# Patient Record
Sex: MALE | Race: ASIAN | NOT HISPANIC OR LATINO
[De-identification: names, ages, dates, MRNs, and addresses within clinical notes are randomized per-mention and may not be internally consistent; named-entity substitution may affect disease eponyms.]

---

## 2023-01-30 PROBLEM — Z00.00 ENCOUNTER FOR PREVENTIVE HEALTH EXAMINATION: Status: ACTIVE | Noted: 2023-01-30

## 2023-02-03 ENCOUNTER — NON-APPOINTMENT (OUTPATIENT)
Age: 38
End: 2023-02-03

## 2023-02-03 ENCOUNTER — APPOINTMENT (OUTPATIENT)
Dept: OTOLARYNGOLOGY | Facility: CLINIC | Age: 38
End: 2023-02-03
Payer: MEDICAID

## 2023-02-03 VITALS — TEMPERATURE: 97.3 F | BODY MASS INDEX: 31.5 KG/M2 | HEIGHT: 71 IN | WEIGHT: 225 LBS

## 2023-02-03 DIAGNOSIS — J32.9 CHRONIC SINUSITIS, UNSPECIFIED: ICD-10-CM

## 2023-02-03 PROCEDURE — 99204 OFFICE O/P NEW MOD 45 MIN: CPT | Mod: 25

## 2023-02-03 PROCEDURE — 31231 NASAL ENDOSCOPY DX: CPT

## 2023-02-03 NOTE — HISTORY OF PRESENT ILLNESS
[de-identified] : 36 y/o M pt referred by Dr. Jeff Steele with nasal polyps. Pt has been on nasal sprays for the past week which he states has not provided durable relief. Saw allergist last week who prescribed him steroids but still with sinus discomfort - did nto fix issue\par + congestion and pressure chronically

## 2023-02-03 NOTE — ASSESSMENT
[FreeTextEntry1] : 38 y/o M pt with nasal polyps and chronic sinusitis.\par CT impression 1/25/23: 1. Complete bilateral frontal sinus disease with occluded frontoethmoidal junctions. 2.Near complete opacification of the bilateral ethmoid and maxillary sinuses with occluded ostiomeatal units. 3.Moderate right and complete left sphenoid sinus disease. 4.Nasal septal deviation 5.Nasal polyps\par \par Pt has severe polyps and sinus disease and is considering surgery or procedure intervention as medication management has not provided durable relief at this point.\par On exam, pt presents with L DNS, R side of nose packed with polyp, purulence and drainage b/l and caudal deflection to the L.\par - Risks benefits and alternatives of endoscopic sinus surgery with possible image guidance possible septoplasty bilateral inferior turbinate reduction discussed with patient at length. Risks discussed include but were not limited to bleeding, infection, persistent symptoms, scarring, injury to the skull base and brain and CSF leak, injury to orbit and eye, crusting, septal hematoma, septal perforation results in whistling, empty nose syndrome, crusting and bleeding as well as continued nasal obstruction etc.  were discussed. For polyps, discussed very high recurrence rate that require future surveillance, maintenance and likelihood of need for further procedures. Also discussed option of office balloon/hybrid balloon dilation and office sinus surgery - similar risk profile, will not address septum/ turbs and takes a generally more conservative approach with quicker recovery, however higher possibility for need for future intervention. has significant disease advised sleeping procedure however pt did not want the downtime, anesthesia or to wait for the OR and would likely push it off for over a year and wanted to try office procedure first and see if will help \par would plan for L  for caudal deflection if did septum and valve \par -Pt elected to do in office procedure for now and will consider sleeping surgery in the future, understands higher risk recurrence and less complete cure\par -Discussed with pt he can follow up with allergist Dr. Rees as pt has severe polyps\par \par

## 2023-02-03 NOTE — CONSULT LETTER
[Please see my note below.] : Please see my note below. [FreeTextEntry1] : Dear Dr. REGINA LOPEZ \par I had the pleasure of evaluating your patient CARLYLE SAMPSON, thank you for allowing us to participate in their care. please see full note detailing our visit below.\par If you have any questions, please do not hesitate to call me and I would be happy to discuss further. \par \par Magdiel Moise M.D.\par Attending Physician,  \par Department of Otolaryngology - Head and Neck Surgery\par [facility] \par Office: [phone]\par Fax: [phone]\par \par

## 2023-02-03 NOTE — REVIEW OF SYSTEMS
[Nasal Congestion] : nasal congestion [Sinus Pain] : sinus pain [Sinus Pressure] : sinus pressure [Negative] : Heme/Lymph [As Noted in HPI] : as noted in HPI [de-identified] : breathing issues [FreeTextEntry1] : m

## 2023-02-03 NOTE — PHYSICAL EXAM
[Nasal Endoscopy Performed] : nasal endoscopy was performed, see procedure section for findings [Normal] : no abnormal secretions [de-identified] : \par caudal deflection to L

## 2023-02-03 NOTE — PROCEDURE
[FreeTextEntry6] : Procedure performed: Nasal Endoscopy- Diagnostic\par Pre-op indication(s): nasal congestion\par Post-op indication(s): nasal congestion \par Verbal and/or written consent obtained from patient\par Anterior rhinoscopy insufficient to account for symptoms\par Scope #: 3,  flexible fiber optic telescope \par The scope was introduced in the nasal passage between the middle and inferior turbinates to exam the inferior portion of the middle meatus and the fontanelle, as well as the maxillary ostia.  Next, the scope was passed medically and posteriorly to the middle turbinates to examine the sphenoethmoid recess and the superior turbinate region.\par Upon visualization the finders are as follows:\par Nasal Septum:L DNS\par Bilateral - Mucosa: boggy turbinates, Mucous: scant, Polyp: +seen, Inferior Turbinate: boggy, Middle Turbinate: normal, Superior Turbinate: normal, Inferior Meatus: narrow, Middle Meatus: narrow, Super Meatus:normal, Sphenoethmoidal Recess: clear\par L DNS, R side of nose packed with polyp, purulence and drainage b/l

## 2023-02-21 ENCOUNTER — APPOINTMENT (OUTPATIENT)
Dept: PEDIATRIC ALLERGY IMMUNOLOGY | Facility: CLINIC | Age: 38
End: 2023-02-21

## 2023-02-21 DIAGNOSIS — Z01.818 ENCOUNTER FOR OTHER PREPROCEDURAL EXAMINATION: ICD-10-CM

## 2023-02-22 ENCOUNTER — APPOINTMENT (OUTPATIENT)
Dept: OTOLARYNGOLOGY | Facility: CLINIC | Age: 38
End: 2023-02-22

## 2023-02-27 LAB — SARS-COV-2 N GENE NPH QL NAA+PROBE: NOT DETECTED

## 2023-02-28 ENCOUNTER — APPOINTMENT (OUTPATIENT)
Dept: OTOLARYNGOLOGY | Facility: CLINIC | Age: 38
End: 2023-02-28
Payer: MEDICAID

## 2023-02-28 VITALS
BODY MASS INDEX: 31.5 KG/M2 | SYSTOLIC BLOOD PRESSURE: 120 MMHG | DIASTOLIC BLOOD PRESSURE: 83 MMHG | HEART RATE: 56 BPM | WEIGHT: 225 LBS | HEIGHT: 71 IN

## 2023-02-28 PROCEDURE — 61782 SCAN PROC CRANIAL EXTRA: CPT

## 2023-02-28 PROCEDURE — 31298Z NSL/SINS NDSC W/SINS DILAT: CUSTOM | Mod: 50

## 2023-02-28 PROCEDURE — 31295Z: CUSTOM | Mod: 50

## 2023-02-28 NOTE — PROCEDURE
[FreeTextEntry3] : IG pansinus polyps cleared, large swell body left reduced left NSD trimmed, better clean out right, left more limited ethmoidectomy \par + purulence -cultured \par \par full note attached

## 2023-03-02 ENCOUNTER — NON-APPOINTMENT (OUTPATIENT)
Age: 38
End: 2023-03-02

## 2023-03-06 ENCOUNTER — NON-APPOINTMENT (OUTPATIENT)
Age: 38
End: 2023-03-06

## 2023-03-06 LAB — EAR NOSE AND THROAT CULTURE: NORMAL

## 2023-03-07 LAB — CORE LAB BIOPSY: NORMAL

## 2023-03-10 ENCOUNTER — APPOINTMENT (OUTPATIENT)
Dept: OTOLARYNGOLOGY | Facility: CLINIC | Age: 38
End: 2023-03-10
Payer: MEDICAID

## 2023-03-10 VITALS — WEIGHT: 225 LBS | BODY MASS INDEX: 31.5 KG/M2 | HEIGHT: 71 IN | TEMPERATURE: 97.5 F

## 2023-03-10 PROCEDURE — 31237 NSL/SINS NDSC SURG BX POLYPC: CPT | Mod: 50

## 2023-03-10 PROCEDURE — 99213 OFFICE O/P EST LOW 20 MIN: CPT | Mod: 25

## 2023-03-10 NOTE — CONSULT LETTER
[Please see my note below.] : Please see my note below. [FreeTextEntry1] : Dear Dr. REGINA LOPEZ \par I had the pleasure of evaluating your patient CARLYLE SAMPSON, thank you for allowing us to participate in their care. please see full note detailing our visit below.\par If you have any questions, please do not hesitate to call me and I would be happy to discuss further. \par \par Magdiel Moise M.D.\par Attending Physician,  \par Department of Otolaryngology - Head and Neck Surgery\par [facility] \par Office: [phone]\par Fax: [phone]\par \par

## 2023-03-10 NOTE — PROCEDURE
[FreeTextEntry3] : procedure - bilateral endoscopic nasal  debridement\par Bilateral nasal cavities inspected with #0 ridged sinus endoscope. bilateral middle meatuses were explored and suction and agitator were used to open ostiums and open any forming scar bands. all sinuses were explored and open at end of procedure\par

## 2023-03-10 NOTE — HISTORY OF PRESENT ILLNESS
[de-identified] : 38 y/o M pt presents today for post-op visit. Pt is s/p IG pansinus polyps cleared, large swell body left reduced left NSD trimmed, better clean out right, left more limited ethmoidectomy \par + purulence -cultured \par Pt states he is doing much better since procedure and feels like head feels lighter. Pt very happy with outcome\par Pt denies pain, drainage, or vision problems.

## 2023-03-10 NOTE — ASSESSMENT
[FreeTextEntry1] : 38 y/o M pt with nasal polyps and chronic sinusitis.\par CT impression 1/25/23: 1. Complete bilateral frontal sinus disease with occluded frontoethmoidal junctions. 2.Near complete opacification of the bilateral ethmoid and maxillary sinuses with occluded ostiomeatal units. 3.Moderate right and complete left sphenoid sinus disease. 4.Nasal septal deviation 5.Nasal polyps\par \par Pt now s/p IG pansinus polyps cleared, large swell body left reduced left NSD trimmed, better clean out right, left more limited ethmoidectomy The patient was debrided bilaterally with rigid suction as a result of nasal crusting. breathing much improved after detriment. Patient should continue to avoid nose blowing, heavy lifting or exercising, and should start a regimen of over the counter nasal saline spray for moisturization of the nasal cavities\par -will start pt on budesonide washes \par -will follow up to assure no scarring and keep sinuses open\par -overall pt is doing very well and feels much improvement in breathing since procedure

## 2023-03-28 ENCOUNTER — APPOINTMENT (OUTPATIENT)
Dept: OTOLARYNGOLOGY | Facility: CLINIC | Age: 38
End: 2023-03-28
Payer: MEDICAID

## 2023-03-28 VITALS
BODY MASS INDEX: 30.48 KG/M2 | TEMPERATURE: 98 F | WEIGHT: 225 LBS | HEART RATE: 69 BPM | SYSTOLIC BLOOD PRESSURE: 139 MMHG | DIASTOLIC BLOOD PRESSURE: 89 MMHG | OXYGEN SATURATION: 98 % | HEIGHT: 72 IN

## 2023-03-28 DIAGNOSIS — J32.4 CHRONIC PANSINUSITIS: ICD-10-CM

## 2023-03-28 DIAGNOSIS — R09.81 NASAL CONGESTION: ICD-10-CM

## 2023-03-28 DIAGNOSIS — J33.9 NASAL POLYP, UNSPECIFIED: ICD-10-CM

## 2023-03-28 PROCEDURE — 99213 OFFICE O/P EST LOW 20 MIN: CPT | Mod: 25

## 2023-03-28 PROCEDURE — 31237 NSL/SINS NDSC SURG BX POLYPC: CPT | Mod: 50

## 2023-03-28 RX ORDER — METHYLPREDNISOLONE 4 MG/1
4 TABLET ORAL
Qty: 1 | Refills: 1 | Status: COMPLETED | COMMUNITY
Start: 2023-02-23 | End: 2023-03-28

## 2023-03-28 RX ORDER — PREDNISONE 10 MG/1
10 TABLET ORAL
Qty: 27 | Refills: 0 | Status: COMPLETED | COMMUNITY
Start: 2023-02-28 | End: 2023-03-28

## 2023-03-28 RX ORDER — BUDESONIDE 3 MG/1
3 CAPSULE, COATED PELLETS ORAL
Qty: 1 | Refills: 3 | Status: ACTIVE | COMMUNITY
Start: 2023-03-28 | End: 1900-01-01

## 2023-03-28 RX ORDER — OXYCODONE AND ACETAMINOPHEN 5; 325 MG/1; MG/1
5-325 TABLET ORAL
Qty: 20 | Refills: 0 | Status: COMPLETED | COMMUNITY
Start: 2023-02-23 | End: 2023-03-28

## 2023-03-28 RX ORDER — PROMETHAZINE HYDROCHLORIDE 12.5 MG/1
12.5 TABLET ORAL EVERY 4 HOURS
Qty: 6 | Refills: 0 | Status: COMPLETED | COMMUNITY
Start: 2023-02-23 | End: 2023-03-28

## 2023-03-28 RX ORDER — AMOXICILLIN AND CLAVULANATE POTASSIUM 875; 125 MG/1; MG/1
875-125 TABLET, COATED ORAL
Qty: 20 | Refills: 0 | Status: COMPLETED | COMMUNITY
Start: 2023-02-23 | End: 2023-03-28

## 2023-03-28 RX ORDER — DIAZEPAM 5 MG/1
5 TABLET ORAL
Qty: 2 | Refills: 0 | Status: COMPLETED | COMMUNITY
Start: 2023-02-23 | End: 2023-03-28

## 2023-03-29 NOTE — CONSULT LETTER
[Please see my note below.] : Please see my note below. [FreeTextEntry1] : Dear Dr. REGINA LOPEZ \par I had the pleasure of evaluating your patient CARLYLE SAMPSON, thank you for allowing us to participate in their care. please see full note detailing our visit below.\par If you have any questions, please do not hesitate to call me and I would be happy to discuss further. \par \par Magdiel Moise M.D.\par Attending Physician,  \par Department of Otolaryngology - Head and Neck Surgery\par [facility] \par Office: [phone]\par Fax: [phone]\par \par

## 2023-03-29 NOTE — HISTORY OF PRESENT ILLNESS
[de-identified] : 36 y/o M pt presents today for post-op visit. Pt is s/p IG pansinus polyps cleared, large swell body left reduced left NSD trimmed, better clean out right, left more limited ethmoidectomy, + purulence -cultured. \par Pt states he is doing much better since procedure and is breathing very well.\par Pt denies pain, drainage, bleeding, or vision problems. \par Patient is doing washes twice daily.

## 2023-03-29 NOTE — END OF VISIT
[FreeTextEntry3] : I personally saw and examined the patient in detail. I spoke to MILE Solano regarding the assessment and plan of care.  I preformed the procedures and I reviewed the above assessment and plan of care, and agree. I have made changes in changes in the body of the note where appropriate.

## 2023-03-29 NOTE — ASSESSMENT
[FreeTextEntry1] : 36 y/o M pt with nasal polyps and chronic sinusitis.\par CT impression 1/25/23: 1. Complete bilateral frontal sinus disease with occluded frontoethmoidal junctions. 2.Near complete opacification of the bilateral ethmoid and maxillary sinuses with occluded ostiomeatal units. 3.Moderate right and complete left sphenoid sinus disease. 4.Nasal septal deviation 5.Nasal polyps\par \par Pt now s/p IG pansinus polyps cleared, large swell body left reduced left NSD trimmed, better clean out right, left more limited ethmoidectomy The patient was debrided bilaterally with rigid suction as a result of nasal crusting. Breathing much improved after detriment. \par -continue budesonide washes \par -will follow up in 3 weeks to assure no scarring and keep sinuses open\par -overall pt is breathing very well and is very happy with results. \par - recommend appointment with Dr Rees for further evaluation of polyps

## 2023-05-05 ENCOUNTER — APPOINTMENT (OUTPATIENT)
Dept: OTOLARYNGOLOGY | Facility: CLINIC | Age: 38
End: 2023-05-05